# Patient Record
Sex: MALE | Race: WHITE | NOT HISPANIC OR LATINO | Employment: UNEMPLOYED | URBAN - METROPOLITAN AREA
[De-identification: names, ages, dates, MRNs, and addresses within clinical notes are randomized per-mention and may not be internally consistent; named-entity substitution may affect disease eponyms.]

---

## 2022-05-12 ENCOUNTER — OFFICE VISIT (OUTPATIENT)
Dept: URGENT CARE | Facility: URGENT CARE | Age: 37
End: 2022-05-12
Payer: COMMERCIAL

## 2022-05-12 VITALS
HEART RATE: 80 BPM | SYSTOLIC BLOOD PRESSURE: 126 MMHG | OXYGEN SATURATION: 98 % | RESPIRATION RATE: 18 BRPM | DIASTOLIC BLOOD PRESSURE: 76 MMHG | TEMPERATURE: 98.9 F | WEIGHT: 200 LBS

## 2022-05-12 DIAGNOSIS — H60.01 ABSCESS OF RIGHT EXTERNAL EAR: Primary | ICD-10-CM

## 2022-05-12 PROCEDURE — 99203 OFFICE O/P NEW LOW 30 MIN: CPT | Mod: 25 | Performed by: INTERNAL MEDICINE

## 2022-05-12 PROCEDURE — 69000 DRG XTRNL EAR ABSC/HEM SMPL: CPT | Performed by: INTERNAL MEDICINE

## 2022-05-12 RX ORDER — CEPHALEXIN 500 MG/1
500 CAPSULE ORAL 2 TIMES DAILY
Qty: 14 CAPSULE | Refills: 0 | Status: SHIPPED | OUTPATIENT
Start: 2022-05-12 | End: 2022-05-19

## 2022-05-13 NOTE — PATIENT INSTRUCTIONS
This should continue to drain for another couple of days.  Change the gauze as needed to capture drainage.  Once the drainage stops then don't need to continue with bandage.    Can shower as you normally would.

## 2022-05-13 NOTE — PROGRESS NOTES
"Assessment & Plan     Abscess of right external ear  - cephALEXin (KEFLEX) 500 MG capsule; Take 1 capsule (500 mg) by mouth 2 times daily for 7 days  - DRAIN SKIN ABSCESS SIMPLE/SINGLE    Bran Ernst MD  [facility] URGENT CARE [facility]      Subjective     HPI   Noting pain and swelling of the right outer ear.  Started out as an itchy spot.  This has increased in swelling pretty rapidly over the past couple of days. Felt \"like a hard pocket.\" Denies any drainage or fluid.          Objective    /76   Pulse 80   Temp 98.9  F (37.2  C)   Resp 18   Wt 90.7 kg (200 lb)   SpO2 98%   There is no height or weight on file to calculate BMI.  Physical Exam   GENERAL APPEARANCE: healthy, alert and no distress  HENT: there is a fluctuant, erythematous, bulbous and swollen region at the base of the right external ear; the remainder of the OP exam is normal    PROCEDURE: Incision and Drainage of Cutaneous Abscess  The abscessed region was prepped and draped using the usual sterile technique. A #11 blade was then used to alva the abscess resulting in drainage of bloody purulent material. Complete drainage was insured. The patient tolerated the procedure well.           "

## 2023-03-11 ENCOUNTER — OFFICE VISIT (OUTPATIENT)
Dept: URGENT CARE | Facility: URGENT CARE | Age: 38
End: 2023-03-11
Payer: COMMERCIAL

## 2023-03-11 ENCOUNTER — HOSPITAL ENCOUNTER (EMERGENCY)
Facility: CLINIC | Age: 38
Discharge: OTHER INSTITUTION | End: 2023-03-11
Attending: EMERGENCY MEDICINE | Admitting: EMERGENCY MEDICINE
Payer: COMMERCIAL

## 2023-03-11 ENCOUNTER — HOSPITAL ENCOUNTER (EMERGENCY)
Facility: CLINIC | Age: 38
Discharge: HOME | End: 2023-03-11
Attending: EMERGENCY MEDICINE | Admitting: EMERGENCY MEDICINE
Payer: COMMERCIAL

## 2023-03-11 VITALS
HEART RATE: 92 BPM | SYSTOLIC BLOOD PRESSURE: 137 MMHG | WEIGHT: 190 LBS | RESPIRATION RATE: 18 BRPM | DIASTOLIC BLOOD PRESSURE: 83 MMHG | BODY MASS INDEX: 25.73 KG/M2 | OXYGEN SATURATION: 100 % | HEIGHT: 72 IN

## 2023-03-11 VITALS
TEMPERATURE: 97.6 F | RESPIRATION RATE: 18 BRPM | SYSTOLIC BLOOD PRESSURE: 125 MMHG | DIASTOLIC BLOOD PRESSURE: 83 MMHG | HEART RATE: 89 BPM | WEIGHT: 190 LBS | OXYGEN SATURATION: 97 %

## 2023-03-11 VITALS
DIASTOLIC BLOOD PRESSURE: 93 MMHG | OXYGEN SATURATION: 99 % | SYSTOLIC BLOOD PRESSURE: 134 MMHG | HEIGHT: 72 IN | HEART RATE: 98 BPM | RESPIRATION RATE: 18 BRPM | WEIGHT: 190 LBS | TEMPERATURE: 98.5 F | BODY MASS INDEX: 25.73 KG/M2

## 2023-03-11 DIAGNOSIS — H18.9 LESION OF CORNEA: ICD-10-CM

## 2023-03-11 DIAGNOSIS — H57.12 ACUTE LEFT EYE PAIN: Primary | ICD-10-CM

## 2023-03-11 DIAGNOSIS — H16.002 CORNEAL ULCER OF LEFT EYE: Primary | ICD-10-CM

## 2023-03-11 LAB
KOH PREPARATION: NORMAL
KOH PREPARATION: NORMAL

## 2023-03-11 PROCEDURE — 99285 EMERGENCY DEPT VISIT HI MDM: CPT | Mod: 25

## 2023-03-11 PROCEDURE — 250N000011 HC RX IP 250 OP 636: Performed by: STUDENT IN AN ORGANIZED HEALTH CARE EDUCATION/TRAINING PROGRAM

## 2023-03-11 PROCEDURE — 99284 EMERGENCY DEPT VISIT MOD MDM: CPT | Performed by: EMERGENCY MEDICINE

## 2023-03-11 PROCEDURE — 87205 SMEAR GRAM STAIN: CPT | Performed by: STUDENT IN AN ORGANIZED HEALTH CARE EDUCATION/TRAINING PROGRAM

## 2023-03-11 PROCEDURE — 87077 CULTURE AEROBIC IDENTIFY: CPT | Performed by: STUDENT IN AN ORGANIZED HEALTH CARE EDUCATION/TRAINING PROGRAM

## 2023-03-11 PROCEDURE — 87210 SMEAR WET MOUNT SALINE/INK: CPT | Performed by: STUDENT IN AN ORGANIZED HEALTH CARE EDUCATION/TRAINING PROGRAM

## 2023-03-11 PROCEDURE — 250N000013 HC RX MED GY IP 250 OP 250 PS 637: Performed by: STUDENT IN AN ORGANIZED HEALTH CARE EDUCATION/TRAINING PROGRAM

## 2023-03-11 PROCEDURE — 87102 FUNGUS ISOLATION CULTURE: CPT | Performed by: STUDENT IN AN ORGANIZED HEALTH CARE EDUCATION/TRAINING PROGRAM

## 2023-03-11 PROCEDURE — 99283 EMERGENCY DEPT VISIT LOW MDM: CPT | Performed by: EMERGENCY MEDICINE

## 2023-03-11 PROCEDURE — 99215 OFFICE O/P EST HI 40 MIN: CPT | Performed by: NURSE PRACTITIONER

## 2023-03-11 PROCEDURE — 250N000009 HC RX 250

## 2023-03-11 PROCEDURE — 250N000009 HC RX 250: Performed by: STUDENT IN AN ORGANIZED HEALTH CARE EDUCATION/TRAINING PROGRAM

## 2023-03-11 RX ORDER — ACETAMINOPHEN 325 MG/1
975 TABLET ORAL ONCE
Status: COMPLETED | OUTPATIENT
Start: 2023-03-11 | End: 2023-03-11

## 2023-03-11 RX ORDER — IBUPROFEN 600 MG/1
600 TABLET, FILM COATED ORAL ONCE
Status: COMPLETED | OUTPATIENT
Start: 2023-03-11 | End: 2023-03-11

## 2023-03-11 RX ORDER — PROPARACAINE HYDROCHLORIDE 5 MG/ML
SOLUTION/ DROPS OPHTHALMIC
Status: COMPLETED
Start: 2023-03-11 | End: 2023-03-11

## 2023-03-11 RX ORDER — PROPARACAINE HYDROCHLORIDE 5 MG/ML
2 SOLUTION/ DROPS OPHTHALMIC ONCE
Status: COMPLETED | OUTPATIENT
Start: 2023-03-11 | End: 2023-03-11

## 2023-03-11 RX ADMIN — VANCOMYCIN HYDROCHLORIDE 1 DROP: 500 INJECTION, POWDER, LYOPHILIZED, FOR SOLUTION INTRAVENOUS at 20:21

## 2023-03-11 RX ADMIN — PROPARACAINE HYDROCHLORIDE 2 DROP: 5 SOLUTION/ DROPS OPHTHALMIC at 18:00

## 2023-03-11 RX ADMIN — TOBRAMYCIN 1 DROP: 40 INJECTION INTRAMUSCULAR; INTRAVENOUS at 20:21

## 2023-03-11 ASSESSMENT — ENCOUNTER SYMPTOMS
EYE PAIN: 1
EYE REDNESS: 1
HEADACHES: 1

## 2023-03-11 ASSESSMENT — ACTIVITIES OF DAILY LIVING (ADL)
ADLS_ACUITY_SCORE: 33
ADLS_ACUITY_SCORE: 35

## 2023-03-11 NOTE — ED TRIAGE NOTES
Pt presents from [facility] for evaluation of a lesion of the cornea of the left eye.       Triage Assessment     Row Name 03/11/23 3654       Triage Assessment (Adult)    Airway WDL WDL       Respiratory WDL    Respiratory WDL WDL       Skin Circulation/Temperature WDL    Skin Circulation/Temperature WDL WDL       Cardiac WDL    Cardiac WDL WDL       Peripheral/Neurovascular WDL    Peripheral Neurovascular WDL WDL       Cognitive/Neuro/Behavioral WDL    Cognitive/Neuro/Behavioral WDL WDL
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
## 2023-03-11 NOTE — ED TRIAGE NOTES
"Yesterday pt reports noticing a burning sensation in his left eye, \"like I've got an eyelash in there or something.\" Pt states the discomfort continued but vision was ok and pt used eye drops without relief. Today pt woke and vision in left eye is very poor, seen in UC and found to have lesions on his sclera and sent to ED for further eval.     Triage Assessment     Row Name 03/11/23 6084       Triage Assessment (Adult)    Airway WDL WDL       Respiratory WDL    Respiratory WDL WDL       Skin Circulation/Temperature WDL    Skin Circulation/Temperature WDL WDL       Cardiac WDL    Cardiac WDL WDL       Peripheral/Neurovascular WDL    Peripheral Neurovascular WDL WDL       Cognitive/Neuro/Behavioral WDL    Cognitive/Neuro/Behavioral WDL WDL              "

## 2023-03-11 NOTE — PROGRESS NOTES
Assessment & Plan     Acute left eye pain  Left eye evaluated with fluoroscein stain and noted to have 2 mm circular lesion on sclera.    Advised to go to ER for emergent ophthalmologic eval given no known trauma or injury and no vision in that eye since the pain started.    Pt agrees and will procede to ER, friend to drive him.        Return in about 1 week (around 3/18/2023) for with regular provider if symptoms persist.    CANDY Perera CNP  [facility] URGENT CARE NAHOMI Quinteros is a 38 year old male who presents to clinic today for the following health issues:  Chief Complaint   Patient presents with     Eye Pain Left Eye     Feels like there is a foreign object in the left eye onset yesterday.     HPI      Eye Problem    Onset of symptoms was 1 day(s) ago.   Location: left eye   Course of illness is worsening.    Severity moderately severe  Current and Associated symptoms: pain, burning, redness, eyelid swelling, decreased vision  Treatment measures tried include none  Context: Possible foreign body  Denied any known welding injury /sawdust or trauma.    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  Lots of dust and grime but no specific known injury.   Reports he can't see out of left eye at all since the pain onset yesterday.      Review of Systems  Constitutional, HEENT, cardiovascular, pulmonary, GI, , musculoskeletal, neuro, skin, endocrine and psych systems are negative, except as otherwise noted.      Objective    /83 (BP Location: Left arm, Patient Position: Sitting, Cuff Size: Adult Regular)   Pulse 89   Temp 97.6  F (36.4  C) (Tympanic)   Resp 18   Wt 86.2 kg (190 lb)   SpO2 97%   Physical Exam   GENERAL: healthy, alert and no distress  EYES: right  Eye grossly normal to inspection, left eyelids- periorbital edema left and conjunctiva/corneas- conjunctival injection left, yellow colored discharge present left and 2 mm circular lesion on sclera near pupil with fluoroscein  stain.    NECK: no adenopathy, no asymmetry, masses, or scars and thyroid normal to palpation  RESP: lungs clear to auscultation - no rales, rhonchi or wheezes  CV: regular rate and rhythm, normal S1 S2, no S3 or S4, no murmur, click or rub, no peripheral edema and peripheral pulses strong  ABDOMEN: soft, nontender, no hepatosplenomegaly, no masses and bowel sounds normal  MS: no gross musculoskeletal defects noted, no edema

## 2023-03-11 NOTE — ED PROVIDER NOTES
[facility]GENCY DEPARTMENT ([facility])  March 11, 2023  History     Chief Complaint   Patient presents with     Eye Problem     HPI  Aldair Dykes is a 38 year old male with a past medical history of polysubstance abuse, antisocial behavior, depression, suicide attempts, homicidal ideation, and meth-induced psychosis who presents from [facility] for evaluation of a lesion of the cornea of the left eye.  He has light perception only    Past Medical History  History reviewed. No pertinent past medical history.  History reviewed. No pertinent surgical history.  HYDROcodone-acetaminophen (NORCO) 5-325 MG per tablet  ibuprofen (ADVIL,MOTRIN) 800 MG tablet  order for DME      No Known Allergies  Family History  History reviewed. No pertinent family history.  Social History   Social History     Tobacco Use     Smoking status: Every Day     Types: Cigarettes     Smokeless tobacco: Never      Past medical history, past surgical history, medications, allergies, family history, and social history were reviewed with the patient. No additional pertinent items.      A medically appropriate review of systems was performed with pertinent positives and negatives noted in the HPI, and all other systems negative.    Physical Exam   BP: (!) 147/89  Pulse: 96  Resp: 18  Height: 182.9 cm (6')  Weight: 86.2 kg (190 lb)  SpO2: 99 %  Physical Exam  Vitals and nursing note reviewed.   Constitutional:       General: He is not in acute distress.  Eyes:        Comments: Large corneal ulceration   Neurological:      Mental Status: He is alert.           ED Course, Procedures, & Data      Procedures       Seen by ophthalmology         Results for orders placed or performed during the hospital encounter of 03/11/23   DAMIAN Preparation     Status: None    Specimen: Eye, Left; Tissue   Result Value Ref Range    KOH Preparation No fungal elements seen     KOH Preparation Reference Range: No fungal elements seen.    Fungal or Yeast Culture  Routine     Status: None (Preliminary result)    Specimen: Corneal ulcer, Left; Scraping   Result Value Ref Range    Culture No growth after 1 day    Anaerobic Bacterial Culture Routine     Status: None (Preliminary result)    Specimen: Corneal ulcer, Left; Tissue   Result Value Ref Range    Culture No anaerobic organisms isolated after 1 day    Tissue Aerobic Bacterial Culture Routine with Gram Stain     Status: Abnormal (Preliminary result)    Specimen: Corneal ulcer, Left; Tissue   Result Value Ref Range    Culture No growth, less than 1 day     Gram Stain Result 2+ Gram negative bacilli (A)     Gram Stain Result 4+ WBC seen (A)      Medications   proparacaine (ALCAINE) 0.5 % ophthalmic solution 2 drop (2 drops Left Eye $Given 3/11/23 1800)   acetaminophen (TYLENOL) tablet 975 mg (975 mg Oral Not Given 3/11/23 2023)   ibuprofen (ADVIL/MOTRIN) tablet 600 mg (600 mg Oral Not Given 3/11/23 2023)     Labs Ordered and Resulted from Time of ED Arrival to Time of ED Departure - No data to display  No orders to display          Critical care was not performed.     Medical Decision Making  The patient's presentation was of moderate complexity (an acute illness with systemic symptoms).    The patient's evaluation involved:  ordering and/or review of 3+ test(s) in this encounter (Culture of corneal lesion)  discussion of management or test interpretation with another health professional (Ophthalmology)    The patient's management necessitated moderate risk (prescription drug management including medications given in the ED).      Assessment & Plan    38-year-old male referred from outside hospital for evaluation of left corneal lesion resulting in substantial visual impairment.  The etiology of the ulceration is unknown.  He is prescribed to eyedrop medications that were filled and given to the patient in the emergency department for use at discharge.  The first drops were instilled there he was given detailed instructions  [facility]    I have reviewed the nursing notes. I have reviewed the findings, diagnosis, plan and need for follow up with the patient.    Discharge Medication List as of 3/11/2023  8:24 PM          Final diagnoses:   Lesion of cornea   I, Nando Perkins, am serving as a trained medical scribe to document services personally performed by Yossi Beverly MD, based on the provider's statements to me.     I, Yossi Beverly MD, was physically present and have reviewed and verified the accuracy of this note documented by Nando Perkins.      Yossi Beverly MD  [facility] EMERGENCY DEPARTMENT  3/11/2023     Yossi Beverly MD  03/12/23 [id number]

## 2023-03-12 NOTE — CONSULTS
OPHTHALMOLOGY CONSULT NOTE  03/11/23    Patient: Aldair Dykes  Consulted by: ED  Reason for Consult: corneal ulcer    HISTORY OF PRESENTING ILLNESS:     Aldair Dykes is a 38 year old male who presents with left eye pain for 1 day.    Patient states yesterday his left eye started feeling dry and painful and was having some photophobia and headaches from the light. He tried wiping the eye and rinsing it in the shower because he had a FBS in the left eye. Also tried visine without any relief. This morning he woke up and had difficulty opening his left eye due to the pain. He has significant FBS in that eye and blurry vision as well. He denies any recent trauma to the eye. He does not wear contact lenses or glasses. No recent illnesses. He endorses cigarette and alcohol use. No IV drug use.    Was seen at both an urgent care and [facility] and was transferred here for ophthalmology evaluation.      Review of systems were otherwise negative except for that which has been stated above.      OCULAR/MEDICAL/SURGICAL HISTORIES:     Past Ocular History:  Last eye exam: Never had eye exam  Prior eye surgery/laser: None  Contact lens wear: None  Glasses: None  Eyedrops: Visine    Family History:  Unknown - adopted    Social History:  No IV drug use  Endorses cigarette smoking, alcohol and marijuana    History reviewed. No pertinent past medical history.    History reviewed. No pertinent surgical history.    EXAMINATION:     Base Eye Exam     Visual Acuity (Snellen - Linear)       Right Left    Near sc 20/25+2 HM          Tonometry (Tonopen, 6:26 PM)       Right Left    Pressure 20 23          Pupils       Pupils    Right PERRL    Left PERRL          Visual Fields       Left Right      Full    Restrictions Total superior temporal, inferior temporal, superior nasal, inferior nasal deficiencies    Limited HM in central vision           Extraocular Movement       Right Left     Full, Ortho Full, Ortho          Dilation      Both eyes: 1.0% Mydriacyl, 2.5% Samuel Synephrine @ 6:27 PM            Slit Lamp and Fundus Exam     External Exam       Right Left    External Normal Normal          Slit Lamp Exam       Right Left    Lids/Lashes Normal Protective ptosis    Conjunctiva/Sclera White and quiet Diffuse injection and chemosis    Cornea Clear 3x3mm corneal ulcer paracentral superiorly with overlying epi defect and surrounding infiltrate, diffuse corneal edema    Anterior Chamber Deep and quiet Diffuse ac reaction with fibrinous stranding, 1mm hypopyon    Iris Round and reactive Round and reactive    Lens Clear No view    Anterior Vitreous Normal No view          Fundus Exam       Right Left    Disc Normal No view    C/D Ratio 0.25     Macula Normal No view    Vessels Normal No view    Periphery Normal No view                Labs/Studies/Imaging Performed:  Corneal cultures pending     ASSESSMENT/PLAN:     Aldair Dykes is a 38 year old male who presents with     # Corneal Ulcer, left eye  Patient with 2 days of eye pain, redness, blurry vision. No known trauma, does not wear contact lenses, unclear etiology.  - VA HM only in the left eye (likely limited by AC reaction), VA 20/25+2 OD  - Anterior exam notable for a 3x3mm corneal ulcer with surrounding infiltrate and significant diffuse corneal edema. Anterior chamber with significant reaction, fibrinous stranding and 1mm hypopyon  - No view to the back due to AC reaction and patient participation  - B scan without RD or vitreous opacities  - Corneal cultures done in ED today (mona negative after cultures)    RECOMMENDATIONS:  - We will follow corneal culture results  - Start fortified Vancomycin q1h in the left eye (ordered for you)  - Start fortified Tobramycin q1h in the left eye (ordered for you)  - Discussed administration of the drops including spacing the drops  - Follow up in the [facility] on Monday 3/13/23 at 7:30am (we will message our schedulers to arrange) - Location  and time in discharge summary  - Discussed return precautions with the patient who expressed understanding    It is our pleasure to participate in this patient's care and treatment. Please contact us with any further questions or concerns.    Patient discussed with senior resident Dimas Capone MD.    Nga Solano MD  Resident Physician, PGY-2  Department of Ophthalmology

## 2023-03-12 NOTE — DISCHARGE INSTRUCTIONS
Use eyedrops as directed, this is very important.  Follow-up in the  eye clinic Monday the 13th at 7:30 AM in the [address] on the ninth floor

## 2023-03-13 ENCOUNTER — OFFICE VISIT (OUTPATIENT)
Dept: OPHTHALMOLOGY | Facility: CLINIC | Age: 38
End: 2023-03-13
Attending: STUDENT IN AN ORGANIZED HEALTH CARE EDUCATION/TRAINING PROGRAM
Payer: COMMERCIAL

## 2023-03-13 DIAGNOSIS — H16.012 CENTRAL CORNEAL ULCER OF LEFT EYE: Primary | ICD-10-CM

## 2023-03-13 PROCEDURE — 99203 OFFICE O/P NEW LOW 30 MIN: CPT | Mod: GC | Performed by: STUDENT IN AN ORGANIZED HEALTH CARE EDUCATION/TRAINING PROGRAM

## 2023-03-13 PROCEDURE — G0463 HOSPITAL OUTPT CLINIC VISIT: HCPCS | Performed by: GENERAL ACUTE CARE HOSPITAL

## 2023-03-13 RX ORDER — TOBRAMYCIN 3 MG/ML
1-2 SOLUTION/ DROPS OPHTHALMIC
COMMUNITY

## 2023-03-13 ASSESSMENT — VISUAL ACUITY
OD_SC: 20/20
OS_SC: 1/200 E
METHOD: SNELLEN - LINEAR

## 2023-03-13 ASSESSMENT — CONF VISUAL FIELD
OS_SUPERIOR_TEMPORAL_RESTRICTION: 1
OD_INFERIOR_TEMPORAL_RESTRICTION: 0
OS_INFERIOR_NASAL_RESTRICTION: 1
OD_SUPERIOR_TEMPORAL_RESTRICTION: 0
METHOD: COUNTING FINGERS
OD_SUPERIOR_NASAL_RESTRICTION: 0
OS_INFERIOR_TEMPORAL_RESTRICTION: 1
OD_NORMAL: 1
OD_INFERIOR_NASAL_RESTRICTION: 0
OS_SUPERIOR_NASAL_RESTRICTION: 1

## 2023-03-13 ASSESSMENT — SLIT LAMP EXAM - LIDS
COMMENTS: PROTECTIVE PTOSIS
COMMENTS: NORMAL

## 2023-03-13 ASSESSMENT — EXTERNAL EXAM - LEFT EYE: OS_EXAM: NORMAL

## 2023-03-13 ASSESSMENT — TONOMETRY
OS_IOP_MMHG: 14
OD_IOP_MMHG: 15
IOP_METHOD: TONOPEN

## 2023-03-13 ASSESSMENT — EXTERNAL EXAM - RIGHT EYE: OD_EXAM: NORMAL

## 2023-03-13 NOTE — PROGRESS NOTES
Ophthalmology Acute Clinic     Encounter Diagnosis   Name Primary?     Central corneal ulcer of left eye Yes         HPI:   Aldair Dykes is a 38 year old male who presents for ED follow-up of left corneal ulcer.     Patient states that about one month ago he felt like he had foreign body sensation and felt irritation/pain for two weeks and then went away. The symptoms reoccurred on Friday before he went to the ED.    He has been compliant with his drops. He has been sleeping in the night and is not waking up to place drops.    Past Ocular History:  Last eye exam: Never had eye exam  Prior eye surgery/laser: None  Contact lens wear: None  Glasses: None  Eyedrops: antibiotic drops (see below)     Family History:  Unknown - adopted     Social History:  No IV drug use  Endorses cigarette smoking, alcohol and marijuana    Review of systems for the eyes was negative other than the pertinent positives/negatives listed in the HPI.        Assessment & Plan      Aldair Dykes is a 38 year old male with the following diagnoses:   1. Central corneal ulcer of left eye    -Symptoms started 3/10/23  -Seen in ED on 3/11/23. 3x3 corneal ulcer paracentral. Started on fortified abx vanc and tobra q1h  -Has been on antibiotic drops ~36 hours  -Vision improved from HM to E at 1 foot  -Ulcer measuring 2mm (H) x 1mm (width) (photo below and in media tab)  -Culture: gram stain 2+ gram neg bacilli (concerning for pseudomonas)  -Trace hypopyon present  PLAN:  -Continue fortified vanc and tobra q1h during the day, and okay to be q2h during the night (wake up and place drops in)  -RTC on Wednesday, 2pm. If looking better, can continue taper. Will adjust abx regimen pending culture results      Patient seen with Dr. Knight    Thank you for entrusting us with your care  Remberto Loera MD, PGY2  Ophthalmology Resident  [facility]  Pager: [phone]  03/13/23 8:18 AM        Attending Physician Attestation:  Complete  documentation of historical and exam elements from today's encounter can be found in the full encounter summary report (not reduplicated in this progress note).  I personally obtained the chief complaint(s) and history of present illness.  I confirmed and edited as necessary the review of systems, past medical/surgical history, family history, social history, and examination findings as documented by others; and I examined the patient myself.  I personally reviewed the relevant tests, images, and reports as documented above.  I formulated and edited as necessary the assessment and plan and discussed the findings and management plan with the patient and family. - Willard Knight MD

## 2023-03-13 NOTE — NURSING NOTE
Chief Complaints and History of Present Illnesses   Patient presents with     Follow Up     Corneal ulcer     Chief Complaint(s) and History of Present Illness(es)     Follow Up            Laterality: left eye    Course: stable    Associated symptoms: eye pain, redness, headache, discharge and burning    Treatments tried: eye drops    Pain scale: 5/10    Comments: Corneal ulcer          Comments    Patient was seen in the ED on 3/11/2023 for a left eye corneal ulcer.  He states the his eye hurts when he looks down or tries to focus on something close.  He has a headache that seems constant around his left eye.  He wakes with his eye stuck shut/mattered shut.  He tells me that his left eye discomfort/pain started a month ago with redness, mattering and a FB sensation under the upper lid.    He is using:  Tobramycin every hour in the left eye while awake  Vancoycin every hour in the left eye while awake    NIELS Woody 7:53 AM  March 13, 2023

## 2023-03-13 NOTE — Clinical Note
CC:  Erica Cruz is here today for Physical (06/22/2020)   .    Medications: medications verified and updated  Refills needed today?  NO  Patient [facility]  Advanced directives: Not Applicable    There are no preventive care reminders to display for this patient.  Patient is up to date, no discussion needed.          Corneal ulcer in acute clinic. Thanks

## 2023-03-13 NOTE — PATIENT INSTRUCTIONS
Continue antibiotic drops every hour during the day, and okay to decrease to every 2 hours overnight. Try your best to wake up every two hours and place the eye drops.    Return to clinic on Wednesday at 2pm.

## 2023-03-15 ENCOUNTER — OFFICE VISIT (OUTPATIENT)
Dept: OPHTHALMOLOGY | Facility: CLINIC | Age: 38
End: 2023-03-15
Attending: STUDENT IN AN ORGANIZED HEALTH CARE EDUCATION/TRAINING PROGRAM
Payer: COMMERCIAL

## 2023-03-15 DIAGNOSIS — H16.012 CENTRAL CORNEAL ULCER OF LEFT EYE: Primary | ICD-10-CM

## 2023-03-15 PROCEDURE — G0463 HOSPITAL OUTPT CLINIC VISIT: HCPCS

## 2023-03-15 ASSESSMENT — CONF VISUAL FIELD
OD_SUPERIOR_NASAL_RESTRICTION: 0
OS_SUPERIOR_NASAL_RESTRICTION: 1
OS_SUPERIOR_TEMPORAL_RESTRICTION: 1
OD_NORMAL: 1
OD_SUPERIOR_TEMPORAL_RESTRICTION: 0
OD_INFERIOR_TEMPORAL_RESTRICTION: 0
OS_INFERIOR_TEMPORAL_RESTRICTION: 3
OD_INFERIOR_NASAL_RESTRICTION: 0
OS_INFERIOR_NASAL_RESTRICTION: 1

## 2023-03-15 ASSESSMENT — TONOMETRY
IOP_METHOD: ICARE
OS_IOP_MMHG: 13
OD_IOP_MMHG: 11

## 2023-03-15 ASSESSMENT — VISUAL ACUITY
METHOD: SNELLEN - LINEAR
OD_SC: 20/20
OS_SC: 1'/200E

## 2023-03-15 NOTE — NURSING NOTE
Chief Complaints and History of Present Illnesses   Patient presents with     Corneal Ulcer Follow Up     Chief Complaint(s) and History of Present Illness(es)     Corneal Ulcer Follow Up            Laterality: left eye    Onset: 2 days ago          Comments    Pt. States that peripheral vision LE seems to have improved LE. Still some pain and headaches.   Sara Krishnamurthy COT 2:11 PM March 15, 2023

## 2023-03-15 NOTE — PROGRESS NOTES
Ophthalmology Acute Clinic     Encounter Diagnosis   Name Primary?     Central corneal ulcer of left eye Yes         HPI:   Aldair Dykes is a 38 year old male who presents for ED follow-up of left corneal ulcer.     Patient states that about one month ago he felt like he had foreign body sensation and felt irritation/pain for two weeks and then went away. The symptoms reoccurred on Friday before he went to the ED.    He has been compliant with his drops. He has been sleeping in the night and is not waking up to place drops.    Past Ocular History:  Last eye exam: Never had eye exam  Prior eye surgery/laser: None  Contact lens wear: None  Glasses: None  Eyedrops: antibiotic drops (see below)     Family History:  Unknown - adopted     Social History:  No IV drug use  Endorses cigarette smoking, alcohol and marijuana    Review of systems for the eyes was negative other than the pertinent positives/negatives listed in the HPI.        Assessment & Plan      Aldair Dykes is a 38 year old male with the following diagnoses:   1. Central corneal ulcer of left eye    -Symptoms started 3/10/23  -Seen in ED on 3/11/23. 3x3 corneal ulcer paracentral. Started on fortified abx vanc and tobra q1h  -Has been on antibiotic drops ~36 hours  -Vision improved from HM to E at 1 foot  -Ulcer measuring 2mm (H) x 1mm (width) (photo below and in media tab)  -Culture: isolated Staph aureus. Pan-sensitive  -Trace hypopyon present  PLAN:  -Continue fortified vanc and tobra q1h during the day, and okay to be q2h during the night (wake up and place drops in)  -RTC on Wednesday, 2pm. If looking better, can continue taper. Will adjust abx regimen pending culture results      Patient seen with Dr. Knight    Thank you for entrusting us with your care  Remberto Loera MD, PGY2  Ophthalmology Resident  [facility]  Pager: [phone]  03/13/23 8:18 AM

## 2023-03-16 ENCOUNTER — TELEPHONE (OUTPATIENT)
Dept: OPHTHALMOLOGY | Facility: CLINIC | Age: 38
End: 2023-03-16
Payer: COMMERCIAL

## 2023-03-16 NOTE — TELEPHONE ENCOUNTER
Home/cell [phone].    Left message with direct number at [phone]    Magdaleno Pacheco RN 12:03 PM 03/16/23    ------          Pt treated for cornea ulcer and left eye clinic before seeing provider yesterday.    Called and left message with direct number for rescheduling    Ok per Dr. Loera to see cornea Friday if not able to come today    Called and left message with direct number at [phone]    Magdaleno Pacheco RN 9:19 AM 03/16/23

## 2023-03-17 LAB
BACTERIA TISS BX CULT: ABNORMAL
GRAM STAIN RESULT: ABNORMAL
GRAM STAIN RESULT: ABNORMAL

## 2023-03-18 LAB
BACTERIA TISS BX CULT: ABNORMAL
BACTERIA TISS BX CULT: ABNORMAL

## 2023-03-20 ENCOUNTER — TELEPHONE (OUTPATIENT)
Dept: OPHTHALMOLOGY | Facility: CLINIC | Age: 38
End: 2023-03-20
Payer: COMMERCIAL

## 2023-03-20 NOTE — TELEPHONE ENCOUNTER
[facility] Call Center    Phone Message    May a detailed message be left on voicemail: yes     Reason for Call: Other: Patient is returning call and would like to schedule with Dr Loera. Writer does not have access to schedule with this provider. Please call patient back at [phone]. Thank you.     Action Taken: Message routed to:  [facility] ([facility] Eye    Travel Screening: Not Applicable

## 2023-03-20 NOTE — TELEPHONE ENCOUNTER
We have tried reaching this pt many times, but his phone goes straight to MOIS[facility] Communication Facilitator on 3/20/2023 at 3:12 PM

## 2023-03-23 NOTE — PROGRESS NOTES
Ophthalmology Acute Clinic     Encounter Diagnosis   Name Primary?     Central corneal ulcer of left eye Yes       HPI     Follow Up    In left eye.  Since onset it is gradually improving.  Associated symptoms include eye pain, redness, headache and photophobia.  Treatments tried include eye drops.  Pain was noted as 6/10. Additional comments: Central corneal ulcer of left eye           Comments    Most days, he can not open his left eye until about noon.  He has eye pain and headaches, which are worse in the mornings.    He is using fortified vanc and tobra q1h during the day, and once or twice at night.  He needs refills on both medications.    NILES Woody 7:42 AM  March 24, 2023               Last edited by Ailyn Huerta COT on 3/24/2023  7:42 AM.        HPI:   Aldair Dykes is a 38 year old male who presents for ED follow-up of left corneal ulcer.     Patient states that about one month ago he felt like he had foreign body sensation and felt irritation/pain for two weeks and then went away. The symptoms reoccurred on Friday before he went to the ED.    He denies any known trauma, exposure to water, no CTL wear.     Interval history: He has been compliant with his drops. Still having significant photophobia and headaches. Feels like the vision however is starting to improve.     Past Ocular History:  Last eye exam: Never had eye exam  Prior eye surgery/laser: None  Contact lens wear: None  Glasses: None  Eyedrops: antibiotic drops (see below)     Family History:  Unknown - adopted     Social History:  No IV drug use  Endorses cigarette smoking, alcohol and marijuana    Review of systems for the eyes was negative other than the pertinent positives/negatives listed in the HPI.        Assessment & Plan      Aldair Dykes is a 38 year old male with the following diagnoses:   1. Central corneal ulcer of left eye       -Symptoms started 3/10/23, unclear incidental trigger.  -Seen in ED on 3/11/23. 3x3  corneal ulcer paracentral on initial evaluation. Started on fortified abx vanc and tobra q1h  -Culture: isolated Staph aureus. Pan-sensitive    3/24/23: resolved epi defect today. Stromal opacity less dense today. Resolved hypopyon but still with significant flare and injection.    PLAN:  -STOP vancomycin and tobramycin drops.   Start moxifloxacin 4x a day in the left eye.   Start prednisolone 4x a day in the left eye.   Start atropine once a day in the left eye.   Start artificial tears 4x a day as needed.  Space out drops by at least a few minutes.   -SLP taken today  -RTC on Wednesday in either acute or cornea clinic, v/t    Patient seen with Dr. Eugene Real MD  PGY-4 Resident Physician  Department of Ophthalmology    Attending Physician Attestation:  Complete documentation of historical and exam elements from today's encounter can be found in the full encounter summary report (not reduplicated in this progress note).  I personally obtained the chief complaint(s) and history of present illness.  I confirmed and edited as necessary the review of systems, past medical/surgical history, family history, social history, and examination findings as documented by others; and I examined the patient myself.  I personally reviewed the relevant ophthalmic tests, images, and reports as documented above (if applicable). I agree with the interpretation(s) as documented by the resident and have edited the corresponding report(s) as necessary. I formulated and edited as necessary the assessment and plan and discussed the findings and management plan with the patient and family. I personally reviewed the ophthalmic test(s) associated with this encounter, agree with the interpretation(s) as documented by the resident/fellow, and have edited the corresponding report(s) as necessary.    Willard Knight MD  Cornea Fellow

## 2023-03-24 ENCOUNTER — OFFICE VISIT (OUTPATIENT)
Dept: OPHTHALMOLOGY | Facility: CLINIC | Age: 38
End: 2023-03-24
Attending: OPHTHALMOLOGY
Payer: COMMERCIAL

## 2023-03-24 DIAGNOSIS — H16.012 CENTRAL CORNEAL ULCER OF LEFT EYE: Primary | ICD-10-CM

## 2023-03-24 PROCEDURE — 92285 EXTERNAL OCULAR PHOTOGRAPHY: CPT | Performed by: STUDENT IN AN ORGANIZED HEALTH CARE EDUCATION/TRAINING PROGRAM

## 2023-03-24 PROCEDURE — 92285 EXTERNAL OCULAR PHOTOGRAPHY: CPT | Mod: 26 | Performed by: STUDENT IN AN ORGANIZED HEALTH CARE EDUCATION/TRAINING PROGRAM

## 2023-03-24 PROCEDURE — G0463 HOSPITAL OUTPT CLINIC VISIT: HCPCS | Performed by: STUDENT IN AN ORGANIZED HEALTH CARE EDUCATION/TRAINING PROGRAM

## 2023-03-24 PROCEDURE — 99213 OFFICE O/P EST LOW 20 MIN: CPT | Mod: GC | Performed by: STUDENT IN AN ORGANIZED HEALTH CARE EDUCATION/TRAINING PROGRAM

## 2023-03-24 RX ORDER — MOXIFLOXACIN 5 MG/ML
1 SOLUTION/ DROPS OPHTHALMIC 4 TIMES DAILY
Qty: 3 ML | Refills: 1 | Status: SHIPPED | OUTPATIENT
Start: 2023-03-24

## 2023-03-24 RX ORDER — ATROPINE SULFATE 10 MG/ML
1 SOLUTION/ DROPS OPHTHALMIC DAILY
Qty: 5 ML | Refills: 0 | Status: SHIPPED | OUTPATIENT
Start: 2023-03-24

## 2023-03-24 RX ORDER — IBUPROFEN 200 MG
200 TABLET ORAL EVERY 4 HOURS PRN
COMMUNITY

## 2023-03-24 RX ORDER — PREDNISOLONE ACETATE 10 MG/ML
1 SUSPENSION/ DROPS OPHTHALMIC 4 TIMES DAILY
Qty: 5 ML | Refills: 1 | Status: SHIPPED | OUTPATIENT
Start: 2023-03-24

## 2023-03-24 ASSESSMENT — CONF VISUAL FIELD
OD_SUPERIOR_NASAL_RESTRICTION: 0
OD_INFERIOR_TEMPORAL_RESTRICTION: 0
OD_SUPERIOR_TEMPORAL_RESTRICTION: 0
OS_SUPERIOR_NASAL_RESTRICTION: 1
OS_SUPERIOR_TEMPORAL_RESTRICTION: 1
OD_NORMAL: 1
METHOD: COUNTING FINGERS
OS_INFERIOR_TEMPORAL_RESTRICTION: 1
OS_INFERIOR_NASAL_RESTRICTION: 1
OD_INFERIOR_NASAL_RESTRICTION: 0

## 2023-03-24 ASSESSMENT — VISUAL ACUITY
OS_SC: 20/250
METHOD: SNELLEN - LINEAR
OS_PH_SC: 20/200
OD_SC: 20/20

## 2023-03-24 ASSESSMENT — TONOMETRY
OS_IOP_MMHG: 03
OD_IOP_MMHG: 12
IOP_METHOD: ICARE

## 2023-03-24 ASSESSMENT — SLIT LAMP EXAM - LIDS
COMMENTS: PROTECTIVE PTOSIS
COMMENTS: NORMAL

## 2023-03-24 ASSESSMENT — EXTERNAL EXAM - RIGHT EYE: OD_EXAM: NORMAL

## 2023-03-24 ASSESSMENT — EXTERNAL EXAM - LEFT EYE: OS_EXAM: NORMAL

## 2023-03-24 NOTE — Clinical Note
Dr. Eugene Banks,  May you sign this note from a patient I staffed with you about in acute clinic? Thanks for your time.

## 2023-03-24 NOTE — NURSING NOTE
Chief Complaints and History of Present Illnesses   Patient presents with     Follow Up     Central corneal ulcer of left eye     Chief Complaint(s) and History of Present Illness(es)     Follow Up            Laterality: left eye    Course: gradually improving    Associated symptoms: eye pain, redness, headache and photophobia    Treatments tried: eye drops    Pain scale: 6/10    Comments: Central corneal ulcer of left eye          Comments    Most days, he can not open his left eye until about noon.  He has eye pain and headaches, which are worse in the mornings.    He is using fortified vanc and tobra q1h during the day, and once or twice at night.  He needs refills on both medications.    Ailyn Huerta, COT 7:42 AM  March 24, 2023

## 2023-03-24 NOTE — PATIENT INSTRUCTIONS
STOP vancomycin and tobramycin drops.       Start moxifloxacin 4x a day in the left eye.     Start prednisolone 4x a day in the left eye.     Start atropine once a day in the left eye.     Start artificial tears 4x a day as needed.    Space out drops by at least a few minutes.

## 2023-04-08 LAB — BACTERIA SPEC CULT: NO GROWTH

## 2023-04-08 NOTE — RESULT ENCOUNTER NOTE
Final Fungus culture report is NEGATIVE  No treatment or change in treatment per [facility] ED Lab Result Culture protocol.